# Patient Record
(demographics unavailable — no encounter records)

---

## 2025-02-26 NOTE — PHYSICAL EXAM
[No Acute Distress] : no acute distress [Well Nourished] : well nourished [Normal Sclera/Conjunctiva] : normal sclera/conjunctiva [PERRL] : pupils equal round and reactive to light [Normal Outer Ear/Nose] : the outer ears and nose were normal in appearance [No Respiratory Distress] : no respiratory distress  [Clear to Auscultation] : lungs were clear to auscultation bilaterally [Normal Rate] : normal rate  [Regular Rhythm] : with a regular rhythm [Normal S1, S2] : normal S1 and S2 [No Edema] : there was no peripheral edema [Soft] : abdomen soft [Non Tender] : non-tender [No HSM] : no HSM [No CVA Tenderness] : no CVA  tenderness [No Focal Deficits] : no focal deficits [Normal Affect] : the affect was normal [Normal Insight/Judgement] : insight and judgment were intact [No Lymphadenopathy] : no lymphadenopathy [Normal Oropharynx] : the oropharynx was normal [Thyroid Normal, No Nodules] : the thyroid was normal and there were no nodules present [Pedal Pulses Present] : the pedal pulses are present [No Spinal Tenderness] : no spinal tenderness [No Joint Swelling] : no joint swelling [Grossly Normal Strength/Tone] : grossly normal strength/tone [No Rash] : no rash [No Skin Lesions] : no skin lesions [Coordination Grossly Intact] : coordination grossly intact

## 2025-03-06 NOTE — HISTORY OF PRESENT ILLNESS
[FreeTextEntry1] : CPE [de-identified] : Ms. Good is a 71 year old female with no significant hx presenting for CPE, mainly needs vaccination records for immigration paperwork. Pt is from [place], spoke to daughter Piedad, they'd like to check titers. No complaints otherwise, pt doing well overall. No recent hospitalizations or illnesses. Only past surgical hx of c section. Takes osteo-biflex twice daily for knee, hasn't bothered her in years. No other daily medications. 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 Diet is well balanced (salads, fruits, oatmeal), doesn't drink soda or alcohol. Doesn't smoke. Last mammogram very long time ago, never had colonoscopy. Is not open to getting them done, doesn't feel like she needs them. Feels well supported through daughters and [employer].

## 2025-03-06 NOTE — PLAN
[FreeTextEntry1] : Ms. Good is a 71 year old female with no significant hx presenting for CPE, mainly needs vaccination records for immigration paperwork.  #Health maintenance - ophthalmology referral - pt deferring colonoscopy/mammogram - cbc, cmp, A1C, lipid panel - tdap and pneumococcal today, influenza later, check MMR and varicella titers  #White coat HTN - check bp twice daily and log - f/u in 5 weeks

## 2025-03-06 NOTE — HEALTH RISK ASSESSMENT
[Good] : ~his/her~  mood as  good [No] : No [1] : 2) Feeling down, depressed, or hopeless for several days (1) [PHQ-2 Negative - No further assessment needed] : PHQ-2 Negative - No further assessment needed [Never] : Never [Alone] : lives alone [Employed] : employed [Single] : single [de-identified] : with roommate

## 2025-03-06 NOTE — HEALTH RISK ASSESSMENT
[Good] : ~his/her~  mood as  good [No] : No [1] : 2) Feeling down, depressed, or hopeless for several days (1) [PHQ-2 Negative - No further assessment needed] : PHQ-2 Negative - No further assessment needed [Never] : Never [Alone] : lives alone [Employed] : employed [Single] : single [de-identified] : with roommate

## 2025-03-06 NOTE — HISTORY OF PRESENT ILLNESS
[FreeTextEntry1] : CPE [de-identified] : Ms. Good is a 71 year old female with no significant hx presenting for CPE, mainly needs vaccination records for immigration paperwork. Pt is from [place], spoke to daughter Piedad, they'd like to check titers. No complaints otherwise, pt doing well overall. No recent hospitalizations or illnesses. Only past surgical hx of c section. Takes osteo-biflex twice daily for knee, hasn't bothered her in years. No other daily medications. 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 Diet is well balanced (salads, fruits, oatmeal), doesn't drink soda or alcohol. Doesn't smoke. Last mammogram very long time ago, never had colonoscopy. Is not open to getting them done, doesn't feel like she needs them. Feels well supported through daughters and [employer].

## 2025-03-06 NOTE — REVIEW OF SYSTEMS
[Vision Problems] : vision problems [Fever] : no fever [Chills] : no chills [Pain] : no pain [Earache] : no earache [Sore Throat] : no sore throat [Chest Pain] : no chest pain [Palpitations] : no palpitations [Shortness Of Breath] : no shortness of breath [Cough] : no cough [Abdominal Pain] : no abdominal pain [Melena] : no melena [Dysuria] : no dysuria [Frequency] : no frequency [Joint Pain] : no joint pain [Back Pain] : no back pain [Itching] : no itching [Skin Rash] : no skin rash [Headache] : no headache [Memory Loss] : no memory loss [Suicidal] : not suicidal [Depression] : no depression [Easy Bleeding] : no easy bleeding [Easy Bruising] : no easy bruising [FreeTextEntry3] : needs to get vision checked

## 2025-05-24 NOTE — HISTORY OF PRESENT ILLNESS
[FreeTextEntry1] : RPA BP Check  [de-identified] : 71F MHx pre-DM, HTN presenting for BP check. Was started on losartan 25mg qd one month ago. Had been taking BPs at home previously SBPs 130s-150s. /74 today. Has been checking at home, this /61 prior to coming to appointment. Ranges 120s-130s at home, takes BP med in middle of the day.  Repeat BP after speaking to patient 138/74. She feels nervous when she has nigh readings and always feels nervous when she comes to a doctor, but feels her numbers have been better at home and is happy with the improvement so far.

## 2025-05-24 NOTE — HISTORY OF PRESENT ILLNESS
[FreeTextEntry1] : RPA BP Check  [de-identified] : 71F MHx pre-DM, HTN presenting for BP check. Was started on losartan 25mg qd one month ago. Had been taking BPs at home previously SBPs 130s-150s. /74 today. Has been checking at home, this /61 prior to coming to appointment. Ranges 120s-130s at home, takes BP med in middle of the day.  Repeat BP after speaking to patient 138/74. She feels nervous when she has nigh readings and always feels nervous when she comes to a doctor, but feels her numbers have been better at home and is happy with the improvement so far.

## 2025-05-24 NOTE — ASSESSMENT
[FreeTextEntry1] : 71F MHx pre-DM, HTN presenting for BP check.   #HTN pt with better controlled BP compared to prior readings, however with readings in 130s  will trial losartan 50mg qd, instructed patient to continue checking BP at home and monitoring to bring in BP log next visit    RTC 4-5wks w/ Dr Gomez for BP check  Case dw Dr Mendez